# Patient Record
Sex: FEMALE | Race: OTHER | NOT HISPANIC OR LATINO | URBAN - METROPOLITAN AREA
[De-identification: names, ages, dates, MRNs, and addresses within clinical notes are randomized per-mention and may not be internally consistent; named-entity substitution may affect disease eponyms.]

---

## 2017-12-21 ENCOUNTER — EMERGENCY (EMERGENCY)
Facility: HOSPITAL | Age: 42
LOS: 1 days | Discharge: HOME | End: 2017-12-21
Attending: EMERGENCY MEDICINE | Admitting: EMERGENCY MEDICINE
Payer: COMMERCIAL

## 2017-12-21 VITALS
OXYGEN SATURATION: 97 % | HEIGHT: 64 IN | RESPIRATION RATE: 18 BRPM | TEMPERATURE: 99 F | WEIGHT: 179.9 LBS | SYSTOLIC BLOOD PRESSURE: 160 MMHG | DIASTOLIC BLOOD PRESSURE: 97 MMHG | HEART RATE: 89 BPM
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
## 2017-12-21 DIAGNOSIS — R42 DIZZINESS AND GIDDINESS: ICD-10-CM
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
## 2017-12-21 DIAGNOSIS — S60.222A CONTUSION OF LEFT HAND, INITIAL ENCOUNTER: ICD-10-CM
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
## 2017-12-21 PROCEDURE — 99283 EMERGENCY DEPT VISIT LOW MDM: CPT | Mod: 25

## 2017-12-21 PROCEDURE — 73130 X-RAY EXAM OF HAND: CPT | Mod: 26,LT

## 2017-12-21 PROCEDURE — 73130 X-RAY EXAM OF HAND: CPT

## 2017-12-21 RX ORDER — IBUPROFEN 200 MG
600 TABLET ORAL ONCE
Qty: 0 | Refills: 0 | Status: COMPLETED | OUTPATIENT
Start: 2017-12-21 | End: 2017-12-21

## 2017-12-21 RX ADMIN — Medication 600 MILLIGRAM(S): at 23:33

## 2017-12-21 NOTE — ED ADULT NURSE NOTE - OBJECTIVE STATEMENT
pt states she was moving an object and "may have injured" her left hand yesterday. denies any numbness/tingling. hematoma noted to left palm.

## 2017-12-21 NOTE — ED PROVIDER NOTE - PROGRESS NOTE DETAILS
steady gait, no focal neuro deficits, no dizziness, no fractures on xray. likely hand contusion, recommend ice  I have discussed the discharge plan with the patient. The patient agrees with the plan, as discussed.  The patient understands Emergency Department diagnosis is a preliminary diagnosis often based on limited information and that the patient must adhere to the follow-up plan as discussed.  The patient understands that if the symptoms worsen the patient may return to the Emergency Department at any time for further evaluation and treatment.

## 2017-12-21 NOTE — ED PROVIDER NOTE - MUSCULOSKELETAL MINIMAL EXAM
L palm - +ecchymosis and swelling to thenar eminence, TTP, 2+radial, no pain to snuffbox, able to approximate thumb to all digits, full thumb ext/flex, abd/adductino

## 2017-12-21 NOTE — ED ADULT TRIAGE NOTE - CHIEF COMPLAINT QUOTE
Pt presents with DIZZINESS, LEFT HAND BRUISING, after striking left palm against countertop yesterday.

## 2017-12-21 NOTE — ED PROVIDER NOTE - DIAGNOSTIC INTERPRETATION
ER Physician:Aruna  INTERPRETATION:  no acute fracture; no soft tissue swelling noted; normal bony alignment.

## 2017-12-21 NOTE — ED PROVIDER NOTE - OBJECTIVE STATEMENT
42F no PMh c/o L hand pain. pt states yesterday she slammed her palm on counter. tonight pain worsened along with swelling.  pt R hand dominant.  pt also states she traveled from [place] today and did not drink any water during the day. states she is feeling lightheaded.  no HA. no chest pain. no SOB. no LE swelling.

## 2017-12-22 RX ADMIN — Medication 600 MILLIGRAM(S): at 00:04

## 2018-06-19 NOTE — ED ADULT TRIAGE NOTE - RESPIRATORY RATE (BREATHS/MIN)
Anesthesia Type: 2% lidocaine without epinephrine Electrodesiccation Text: The wound bed was treated with electrodesiccation after the biopsy was performed. Bill For Surgical Tray: no X Size Of Lesion In Cm: 0 Consent: Written consent was obtained and risks were reviewed including but not limited to scarring, infection, bleeding, scabbing, incomplete removal, nerve damage and allergy to anesthesia. Billing Type: Third-Party Bill Anesthesia Volume In Cc (Will Not Render If 0): 1 Biopsy Type: H and E Cryotherapy Text: The wound bed was treated with cryotherapy after the biopsy was performed. Silver Nitrate Text: The wound bed was treated with silver nitrate after the biopsy was performed. Biopsy Method: curette Wound Care: No ointment Electrodesiccation And Curettage Text: The wound bed was treated with electrodesiccation and curettage after the biopsy was performed. Hemostasis: [facility] [employer] Lab Facility: [id number] Body Location Override (Optional - Billing Will Still Be Based On Selected Body Map Location If Applicable): left center nose Size Of Lesion In Cm: 0.6 Depth Of Biopsy: dermis Post-Care Instructions: I’ve instructed the patient to remove the bandaid in 2-3 hours; apply rubbing alcohol 2-3 times for a few days. May use antibiotic ointment if needed. Curettage Text: The wound bed was treated with curettage after the biopsy was performed. Dressing: bandage Render Post-Care Instructions In Note?: yes Notification Instructions: Patient will be notified of biopsy results. However, patient instructed to call the office if not contacted within 2 weeks. Type Of Destruction Used: Curettage Lab: [id number] Detail Level: Simple